# Patient Record
Sex: FEMALE | Race: WHITE | URBAN - METROPOLITAN AREA
[De-identification: names, ages, dates, MRNs, and addresses within clinical notes are randomized per-mention and may not be internally consistent; named-entity substitution may affect disease eponyms.]

---

## 2022-12-27 NOTE — IMPRESSION/PLAN
Impression: Combined forms of age-related cataract, bilateral: H25.813. Plan: Cataracts account for the patient's complaints. Discussed all risks, benefits, procedures and recovery. Patient understands changing glasses will not improve vision. Patient desires to have surgery and referred to Dr. Sorin Francois for phacoemulsification with intraocular lens.

## 2022-12-27 NOTE — IMPRESSION/PLAN
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 Plan: Advanced low tension glaucoma. Start Latanoprost QHS OU. Order VF after cataract sx.  Check IOPs in a month with Dr. Hernandez Pulling

## 2023-01-23 NOTE — IMPRESSION/PLAN
Impression: Low-tension glaucoma, bilateral, moderate stage: M33.3836. Plan: Cont. latanoprost qHS OU. Try VF pre-op. No MIGs recommended as baseline IOP low and doubt benefit from MIGs.

## 2023-01-23 NOTE — IMPRESSION/PLAN
Impression: Combined forms of age-related cataract, bilateral: H25.813. Plan: Cataract accounts for pt complaints. Pt desires sx. Schedule CE/IOL both eyes, left then right. Risk/Benefits/Alternatives discussed with patient. Rec. mono-focal only. Consider ORA/LRI. RL2. Target distance. Combination/Generic drops. Order a-scan. Pt not a candidate for Dextenza due to ocular disease. No MIGs recommended. Rec. intra-ocular moxifloxacin (PCN/cephalosporin allergy or Cefuroxime not available) to decrease the risk of endophthalmitis. Rec. intra-ocular moxifloxacin (PCN/cephalosporin allergy or Cefuroxime not available) to decrease the risk of endophthalmitis.

## 2023-04-20 NOTE — IMPRESSION/PLAN
Impression: S/P Cataract Extraction by phacoemulsification without IOL placement OS - 1 Day. Encounter for surgical aftercare following surgery on a sense organ  Z48.810. Plan: Called [facility].  Pt is to go right way after visit for IOL placement

## 2023-05-11 ENCOUNTER — POST-OPERATIVE VISIT (OUTPATIENT)
Dept: URBAN - METROPOLITAN AREA CLINIC 63 | Facility: CLINIC | Age: 58
End: 2023-05-11
Payer: COMMERCIAL

## 2023-05-11 DIAGNOSIS — Z48.810 ENCOUNTER FOR SURGICAL AFTERCARE FOLLOWING SURGERY ON A SENSE ORGAN: Primary | ICD-10-CM

## 2023-05-11 PROCEDURE — 99024 POSTOP FOLLOW-UP VISIT: CPT | Performed by: OPTOMETRIST

## 2023-05-11 RX ORDER — DICLOFENAC SODIUM 1 MG/ML
0.1 % SOLUTION/ DROPS OPHTHALMIC
Qty: 5 | Refills: 1 | Status: ACTIVE
Start: 2023-05-11

## 2023-05-11 RX ORDER — PREDNISOLONE ACETATE 10 MG/ML
1 % SUSPENSION/ DROPS OPHTHALMIC
Qty: 10 | Refills: 1 | Status: ACTIVE
Start: 2023-05-11

## 2023-05-11 ASSESSMENT — INTRAOCULAR PRESSURE
OD: 14
OS: 13

## 2023-05-11 ASSESSMENT — VISUAL ACUITY: OS: 20/50

## 2023-05-11 ASSESSMENT — KERATOMETRY: OS: 43.75

## 2023-05-11 NOTE — IMPRESSION/PLAN
Impression: S/P Cataract Extraction by phacoemulsification with IOL placement OS - 22 Days. Encounter for surgical aftercare following surgery on a sense organ  Z48.810. Rx'd Pred & Diclofenac , gave instructions sheet on how drops should be instilled over course of a month Plan: RTC 3 wks for f/u.

## 2023-06-02 NOTE — IMPRESSION/PLAN
Impression: S/P Cataract Extraction by phacoemulsification with IOL placement OS - 44 Days. Encounter for surgical aftercare following surgery on a sense organ  Z48.810. Plan: Pt doing well POW #1. RTC 3 wks for f/u.

## 2023-07-11 NOTE — IMPRESSION/PLAN
Impression: S/P Cataract Extraction by phacoemulsification with IOL placement OS - 83 Days. Encounter for surgical aftercare following surgery on a sense organ  Z48.810. Plan: Have counselor call for scheduling OD cat surg.

## 2023-07-19 NOTE — IMPRESSION/PLAN
Impression: S/P Cataract Extraction by phacoemulsification with IOL placement OS - 91 Days. Encounter for surgical aftercare following surgery on a sense organ  Z48.810. Plan: Discuss diagnosis and treatment with patient. Pt. refered to Dr. Alfonso Mcmahon for yag consult.